# Patient Record
(demographics unavailable — no encounter records)

---

## 2024-10-09 NOTE — PHYSICAL EXAM
[de-identified] : Constitutional: The patient appears well developed, well nourished. Examination of patients ability to communicate functionally was normal.       Neurologic: Coordination is normal. Alert and oriented to time, place and person. No evidence of mood disorder, calm affect.        LEFT     KNEE  : Inspection of the knee is as follows: moderate effusion. no ecchymosis, no streaking, no erythema, no atrophy, no deformities of the quad tendon and no deformities of patellar tendon.      SCAR NOTED ANTEROMEDIAL KNEE WITHOUT SIGNS OF INFECTION  Palpation of the knee is as follows: medial joint line tenderness, lateral joint line tenderness, medial facet of patella tenderness, lateral facet of patella tenderness and crepitus. no palpable masses and no increased warmth.       Knee Range of Motion is as follows in degrees:        Extension: 5    Flexion: 105        Strength examination of the knee is as follows:    Quadriceps strength is 5/5    Hamstring strength is 5/5       Ligament Stability and Special Test ligamentously stable, negative anterior draw, negative Lachman test, negative posterior draw and no varus or valgus instability.    negative McMurrays test and able to do active straight leg raise without an extensor lag.       Neurological examination of the knee is as follows: light touch is intact throughout.       Gait and function is as follows: mildly antalgic gait.

## 2024-10-09 NOTE — HISTORY OF PRESENT ILLNESS
[de-identified] : Patient is here for a follow up on left knee pain.  He states he was trying to observe but lately the knee pain is severe.  He cant tolerate upstairs.  He notes pain at night when he rolls over

## 2025-01-08 NOTE — PHYSICAL EXAM
[de-identified] : Constitutional: The patient appears well developed, well nourished. Examination of patients ability to communicate functionally was normal.       Neurologic: Coordination is normal. Alert and oriented to time, place and person. No evidence of mood disorder, calm affect.        LEFT     KNEE  : Inspection of the knee is as follows: moderate effusion. no ecchymosis, no streaking, no erythema, no atrophy, no deformities of the quad tendon and no deformities of patellar tendon.      SCAR NOTED ANTEROMEDIAL KNEE WITHOUT SIGNS OF INFECTION  Palpation of the knee is as follows: medial joint line tenderness, lateral joint line tenderness, medial facet of patella tenderness, lateral facet of patella tenderness and crepitus. no palpable masses and no increased warmth.       Knee Range of Motion is as follows in degrees:        Extension: 5    Flexion: 105        Strength examination of the knee is as follows:    Quadriceps strength is 5/5    Hamstring strength is 5/5       Ligament Stability and Special Test ligamentously stable, negative anterior draw, negative Lachman test, negative posterior draw and no varus or valgus instability.    negative McMurrays test and able to do active straight leg raise without an extensor lag.       Neurological examination of the knee is as follows: light touch is intact throughout.       Gait and function is as follows: mildly antalgic gait.

## 2025-01-08 NOTE — PROCEDURE
[FreeTextEntry3] : Large joint injection was performed of the LEFT KNEE  knee, through super[facility] portal. The indication for this procedure was pain, inflammation and x-ray evidence of Osteoarthritis on this or prior visit, and this is a repeat injection series. The site was prepped with betadine and a sterile technique used. An injection of 16.8mg of Gel-Syn, series # 1 was used. Patient tolerated procedure well. Patient was advised to call if redness, pain or fever occur and apply ice for 15 minutes out of every hour for the next 12-24 hours as tolerated. Patient had decreased mobility in the joint and the risks benefits, and alternatives have been discussed, and verbal consent was obtained.

## 2025-01-08 NOTE — HISTORY OF PRESENT ILLNESS
[de-identified] : 01/08/2025  RANGEL BAE 74 year y/o M presents today for follow up for left knee pain.  Treatments since last visit: CSI L knee on 10/09/24 with relief for a couple of weeks, PT D/C about 2 weeks ago Changes Since Last Visit: Patient reports his L knee pain has progressively worsened since last visit.   10/09/24: Patient is here for a follow up on left knee pain.  He states he was trying to observe but lately the knee pain is severe.  He cant tolerate upstairs.  He notes pain at night when he rolls over

## 2025-01-08 NOTE — DISCUSSION/SUMMARY
[de-identified] :  RANGEL BAE 74 year M was seen and evaluated in office today. Following evaluation, the natural history of the pathology was explained in full to the patient in layman's terms.   Several different treatment options were discussed and explained in full to the patient, along with specific risks and benefits of both surgical and non-surgical treatments. Nonsurgical options including but not limited to Corticosteroid Injection, Visco supplementation, Prescription anti-inflammatory medications (both steroidal and non-steroidal), activity modification, non-impact exercise, maintaining a healthy BMI, bracing, and icing were all reviewed. Surgical options including but not limited to arthroscopy, and joint replacement along with other indicated procedures were discussed.   Discussed risk of morbidity associated with proposed treatment plans. These risks include, but are not limited to, the risk associated with prescription strength medications and possible side effects of these medications which include GI hemorrhage with oral medications along with cardiac/renal issues with long term use. Risks with all pertinent surgical interventions as discussed with patient including infection, bleeding, anesthesia complications, NV injury, fracture, DVT, or heterotopic ossification.   Furthermore, discussed with RANGEL that they could also delay any immediate treatment options and continue to observe and self-care for the discussed problem. Discussed Home Exercise Programs as well as Rest, Ice and elevation. Patient had ample time to ask any questions about todays visit and the diagnosis, and all questions were answered. Patient understands the plan going forward.   Lastly, based on this patient's presentation at our office, which is an orthopedic specialist office, this patient inherently / intrinsically has a risk of progression of symptoms/condition, as well as development and/or exacerbation of cardiovascular disease/conditions, obesity, DVT, worsening and chronic pain, and permanent loss of function, as well as decreased abilities to complete activities of daily life.  PLAN:   At this time, following discussion of options with the patient, patient would like to proceed with HA injection series.  At this time the plan is to proceed with HA injection series. Patient has at this time had no relief from previous NSAID use, Physical therapy, Ice and rest. Patient reports that they continue to have pain and limitations with activities of daily life due to knee pain. Patient also reports that they have been avoiding pain inducing activity with no significant relief.   Proceeded with left knee Gelsyn injection, follow up in 1 week for 2nd injection  Entered by Rc Butler acting as scribe. Dr. Bautista Attestation The documentation recorded by the scribe, in my presence, accurately reflects the service I, Dr. Bautista, personally performed, and the decisions made by me with my edits as appropriate.

## 2025-01-15 NOTE — REASON FOR VISIT
[FreeTextEntry2] : Patient here for left knee Gelsyn #2, patient reports mild relief from first injection.

## 2025-01-15 NOTE — PROCEDURE
[FreeTextEntry3] : Large joint injection was performed of the LEFT   knee, through [facility] portal. The indication for this procedure was pain, inflammation and x-ray evidence of Osteoarthritis on this or prior visit, and this is a repeat injection series. The site was prepped with betadine and a sterile technique used. An injection of 16.8mg of Gel-Syn, series # 2 was used. Patient tolerated procedure well. Patient was advised to call if redness, pain or fever occur and apply ice for 15 minutes out of every hour for the next 12-24 hours as tolerated. Patient had decreased mobility in the joint and the risks benefits, and alternatives have been discussed, and verbal consent was obtained.

## 2025-01-22 NOTE — PROCEDURE
[Large Joint Injection] : Large joint injection [Left] : of the left [Knee] : knee [Pain] : pain [Inflammation] : inflammation [X-ray evidence of Osteoarthritis on this or prior visit] : x-ray evidence of Osteoarthritis on this or prior visit [Betadine] : betadine [Sterile technique used] : sterile technique used [Gel-Syn (16.8mg)] : 16.8mg of Gel-Syn [#3] : series #3 [] : Patient tolerated procedure well [Call if redness, pain or fever occur] : call if redness, pain or fever occur [Apply ice for 15min out of every hour for the next 12-24 hours as tolerated] : apply ice for 15 minutes out of every hour for the next 12-24 hours as tolerated [Patient was advised to rest the joint(s) for ____ days] : patient was advised to rest the joint(s) for [unfilled] days [Risks, benefits, alternatives discussed / Verbal consent obtained] : the risks benefits, and alternatives have been discussed, and verbal consent was obtained

## 2025-03-05 NOTE — HISTORY OF PRESENT ILLNESS
[de-identified] : 03/05/2025 RANGEL BAE 74 year y/o M presents today for follow up on left knee pain Treatments since last visit: Gelsyn injections (last injection was 01/22/25) with transient relief, patient reports taking tramadol PRN (prescribed by PM) with minimal relief Changes Since Last Visit: Patient reports left knee pain is worse since last visit.   01/08/2025  RANGEL BAE 74 year y/o M presents today for follow up for left knee pain.  Treatments since last visit: CSI L knee on 10/09/24 with relief for a couple of weeks, PT D/C about 2 weeks ago Changes Since Last Visit: Patient reports his L knee pain has progressively worsened since last visit.   10/09/24: Patient is here for a follow up on left knee pain.  He states he was trying to observe but lately the knee pain is severe.  He cant tolerate upstairs.  He notes pain at night when he rolls over

## 2025-03-05 NOTE — DISCUSSION/SUMMARY
[de-identified] : RANGEL BAE 74 year  M was seen and evaluated in office today. Following evaluation, the natural history of the pathology was explained in full to the patient in layman's terms. At this time, they explained to the patient that based on physical exam and imaging review, patient likely has Medial/lateral meniscal tear of the knee. Discussed with patients that due to this meniscal tear, they are at increased risk of developing knee osteoarthritis. Discussed further with patient that due to the fragile blood supply of the meniscus and the nature of the condition, it is unlikely that the tear will heal on its own, and they will likely experience constant/episodic pain in the knee limiting ADLs. Discussed that due to the meniscal tear and the associated increased risk of progression to knee osteoarthritis ultimately leading to an eventual need for knee arthroplasty in the future.    In the interim, discussed with patient several different treatment options, along with specific risks and benefits of both surgical and non-surgical treatments. Nonsurgical options including but not limited to Corticosteroid Injection, Visco supplementation series, Meloxicam 15mg, Medrol Dose Pack, along with activity modification such as non-impact exercise. Risk of morbidity associated with proposed treatments were discussed. Risks include, but are not limited to, the risk associated with prescription strength medications and possible side effects of these medications which include GI hemorrhage with oral medications along with cardiac/renal issues with long term use  Furthermore, discussed with RANGEL that they could also delay any immediate treatment options and continue to observe and self-care for the discussed problem. Discussed Home Exercise Programs as well as Rest, Ice and elevation. Patient had ample time to ask any questions about todays visit and the diagnosis, and all questions were answered. Patient understands the plan going forward.    Based on this patient's presentation at our office, which is an orthopedic specialist office, this patients condition is expected to progress. Patient, due to this condition has the risk of development and/or exacerbation of cardiovascular disease/conditions, obesity, DVT, worsening and chronic pain, and permanent loss of function, as well as decreased abilities to complete activities of daily life.   PLAN:   HE CANT HAVE MRI DUE TO NERVE STIMULATOR.  Patient has MMT on the left side based on PE.  Discussed options including repeating CSI, NSAIDS as well as scoping the knee vs TKA.  He understands scoping the knee may have give him long lasting relief.  He has DJD.   At this time the patient is indicated for meniscectomy of the LEFT KNEE. Patient has had no relief from CSI and HA series along with HEP. Patient has positive McMurrays test. Patient was advised that due to the mild degenerative changes on xray he may not have full relief from Arthroscopy, and verbally accepted this.

## 2025-03-05 NOTE — PHYSICAL EXAM
[de-identified] : Constitutional: The patient appears well developed, well nourished. Examination of patients ability to communicate functionally was normal.       Neurologic: Coordination is normal. Alert and oriented to time, place and person. No evidence of mood disorder, calm affect.        LEFT     KNEE  : Inspection of the knee is as follows: moderate effusion. no ecchymosis, no streaking, no erythema, no atrophy, no deformities of the quad tendon and no deformities of patellar tendon.      SCAR NOTED ANTEROMEDIAL KNEE WITHOUT SIGNS OF INFECTION  Palpation of the knee is as follows: medial joint line tenderness, lateral joint line tenderness, medial facet of patella tenderness, lateral facet of patella tenderness and crepitus. no palpable masses and no increased warmth.       Knee Range of Motion is as follows in degrees:        Extension: 5    Flexion: 105        Strength examination of the knee is as follows:    Quadriceps strength is 5/5    Hamstring strength is 5/5       Ligament Stability and Special Test ligamentously stable, negative anterior draw, negative Lachman test, negative posterior draw and no varus or valgus instability.    negative McMurrays test and able to do active straight leg raise without an extensor lag.       Neurological examination of the knee is as follows: light touch is intact throughout.       Gait and function is as follows: mildly antalgic gait.

## 2025-04-15 NOTE — HISTORY OF PRESENT ILLNESS
[de-identified] : Patient presents today for 1st PO Visit, patient had Left Knee Arthroscopy Medial/Lateral Partial Meniscectomy with Dr. Bautista on 4/4/25. Patient reports that he was doing well, until 4/11/25 when he was standing up from a chair and heard a pop in his knee and has had continued pain in the medial and posterior aspect of the knee. Patient denies fevers, chills, SOB. Patient reports that his pain is well controlled. Patient reports occasional use of Oxycodone, BID. Ambulating without support.

## 2025-04-15 NOTE — PHYSICAL EXAM
[Left] : left knee [5___] : hamstring 5[unfilled]/5 [] : no extensor lag [[id number]] : flexion 110 degrees [de-identified] : extension 0 degrees

## 2025-05-19 NOTE — HISTORY OF PRESENT ILLNESS
[de-identified] : 05/19/2025  RANGEL BAE 75 year y/o M presents today for follow up on left knee pain. Patient is s/p left knee partial medial and lateral meniscectomy and tricompartmental chondroplasy on 4/4/2025. Treatments since last visit: Patient saw Dr Feliciano on 4/15/2025 for first poa. Patient is in PT 2 x a week.  Changes Since Last Visit: Patient reports he is progressing well, patient denies fevers, chills or SOB.  He states the knee is less painful when compared to pre op.  He still has issues with stairs  03/05/2025 RANGEL BAE 74 year y/o M presents today for follow up on left knee pain Treatments since last visit: Gelsyn injections (last injection was 01/22/25) with transient relief, patient reports taking tramadol PRN (prescribed by PM) with minimal relief Changes Since Last Visit: Patient reports left knee pain is worse since last visit.   01/08/2025  RANGEL BAE 74 year y/o M presents today for follow up for left knee pain.  Treatments since last visit: CSI L knee on 10/09/24 with relief for a couple of weeks, PT D/C about 2 weeks ago Changes Since Last Visit: Patient reports his L knee pain has progressively worsened since last visit.   10/09/24: Patient is here for a follow up on left knee pain.  He states he was trying to observe but lately the knee pain is severe.  He cant tolerate upstairs.  He notes pain at night when he rolls over

## 2025-05-19 NOTE — DISCUSSION/SUMMARY
[de-identified] : At this time the patient is progressing well. Patient demonstrates improvements in both range of motion and strength. Took time to discuss with patient the importance of maintaining ROM and Strength through daily HEP. Patient expressed understanding of this and will continue HEP.    At this time after discussion of the options, the patient would benefit from CONTINUED organized Physical Therapy to increase overall functionality, as they have reported feeling of improvement. The patient was provided with an updated prescription in office today and was instructed to attend PT for 6-8 weeks in order to increase strength and ROM. Patient agreed with this plan and will begin PT as soon as they can. Discussed importance of patient continuing the exercises on their own as well.   Patient was instructed to take antibiotic prophylaxis prior to any dental or GI procedures. The patient understands this precaution is to be followed for life due to the potential risk of infection.    f/u in 2 mos.

## 2025-05-19 NOTE — PHYSICAL EXAM
[de-identified] : Constitutional: The patient appears well developed, well nourished. Examination of patients ability to communicate functionally was normal.       Neurologic: Coordination is normal. Alert and oriented to time, place and person. No evidence of mood disorder, calm affect.        LEFT     KNEE  : Inspection of the knee is as follows: moderate effusion. no ecchymosis, no streaking, no erythema, no atrophy, no deformities of the quad tendon and no deformities of patellar tendon.      SCAR NOTED ANTEROMEDIAL KNEE WITHOUT SIGNS OF INFECTION  Palpation of the knee is as follows:WOUNDS WELL HEALED FROM SCOPE     Knee Range of Motion is as follows in degrees:        Extension: 0    Flexion: 115        Strength examination of the knee is as follows:    Quadriceps strength is 5/5    Hamstring strength is 5/5       Ligament Stability and Special Test ligamentously stable, negative anterior draw, negative Lachman test, negative posterior draw and no varus or valgus instability.    negative McMurrays test and able to do active straight leg raise without an extensor lag.       Neurological examination of the knee is as follows: light touch is intact throughout.       Gait and function is as follows: mildly antalgic gait.

## 2025-07-14 NOTE — DISCUSSION/SUMMARY
[de-identified] : LEFT KNEE: Patient is doing well at this time and continuing to make satisfactory progress status post total joint arthroplasty. Patient has been involved in OPPT since the last visit and continues to demonstrate improvements in ROM and strength.    At this time, discussed with patient that with their demonstrated continued progress in functionality, they may finish PT/continue as desired. Advised patient that going forward, their focus should be on daily HEP to maintain ROM and Strength.    At this time the plan that the patient wishes to move forward with is to observe and continue self care including but not limited to HEP, Ice, NSAIDs and rest as needed. Patient was instructed to f/u if their symptoms do not improve or if there are any further concerns.   RIGHT KNEE:   RANGEL BAE 75 year M was seen and evaluated in office today. Following evaluation, the natural history of the pathology was explained in full to the patient in layman's terms.   Patient was given a prescription for a Medrol dose pack DUE TO INCREASED PAIN AND INFLAMMATION OF THE RIGHT KNEE. There is a moderate risk of morbidity with further treatment, especially from use of prescription strength medication and possible side effects of the medication. We discussed the importance of proper prescription drug management. They were instructed to take the medication as directed for 5 days and will follow up in one month. The patient was also warned of potential risks/side effects of the medication. These potential risks include allergic reaction, temporary weight gain, elevation of blood sugar, increased thirst, headache, blurred vision, dizziness and avascular necrosis. The patient expressed verbal understanding. I recommend that the patient follow-up with their medical physician to discuss any significant specific potential issues with long term use such as interactions with current medications or with exacerbation of underlying medical morbidities.   Patient was advised to CONSULT WITH PCP AND WITH SPINE SPECIALIST REGARDING USE OF MDP AND POSSIBLE ELENITA IN COMING WEEKS.

## 2025-07-14 NOTE — PHYSICAL EXAM
[Right] : right knee [Components well fixed, in good position] : Components well fixed, in good position [de-identified] : Constitutional: The patient appears well developed, well nourished. Examination of patients ability to communicate functionally was normal.       Neurologic: Coordination is normal. Alert and oriented to time, place and person. No evidence of mood disorder, calm affect.        LEFT     KNEE  : Inspection of the knee is as follows: moderate effusion. no ecchymosis, no streaking, no erythema, no atrophy, no deformities of the quad tendon and no deformities of patellar tendon.      WOUNDS WELL HEALED      Knee Range of Motion is as follows in degrees:        Extension: 0    Flexion: 95        Strength examination of the knee is as follows:    Quadriceps strength is 5/5    Hamstring strength is 5/5       Ligament Stability and Special Test ligamentously stable, negative anterior draw, negative Lachman test, negative posterior draw and no varus or valgus instability.    negative McMurrays test and able to do active straight leg raise without an extensor lag.       Neurological examination of the knee is as follows: light touch is intact throughout.       Gait and function is as follows: mildly antalgic gait.    Constitutional: The patient appears well developed, well nourished. Examination of patients ability to communicate functionally was normal.       Neurologic: Coordination is normal. Alert and oriented to time, place and person. No evidence of mood disorder, calm affect.          RIGHT   KNEE  : Inspection of the knee is as follows: MILD  effusion. no ecchymosis, no streaking, no erythema, no atrophy, no deformities of the quad tendon and no deformities of patellar tendon.   SCAR NOTED RIGHT KNEE NO SIGNS OF INFECTION      Palpation of the knee is as follows: medial joint line tenderness, lateral joint line tenderness, medial facet of patella tenderness, lateral facet of patella tenderness and crepitus. no palpable masses and no increased warmth.       Knee Range of Motion is as follows in degrees:        Extension: 0    Flexion: 95        Strength examination of the knee is as follows:    Quadriceps strength is 5/5    Hamstring strength is 5/5       Ligament Stability and Special Test ligamentously stable, negative anterior draw, negative Lachman test, negative posterior draw and no varus or valgus instability.    negative McMurrays test and able to do active straight leg raise without an extensor lag.       Neurological examination of the knee is as follows: light touch is intact throughout.       Gait and function is as follows: mildly antalgic gait.

## 2025-07-14 NOTE — HISTORY OF PRESENT ILLNESS
[de-identified] : 07/14/2025  RANGEL BEA 75 year y/o M presents today for follow up on left knee pain. Patient is s/p left knee partial medial and lateral meniscectomy and tricompartmental chondroplasy on 4/4/2025. Treatments since last visit: PT 2 x a week.  Changes Since Last Visit: Patient reports left knee is progressing well. Patient reports right knee is extremely painful.  The left knee pain has improved when compared to pre op. He still has pain with flexion of the knee.   05/19/2025  RANGEL BAE 75 year y/o M presents today for follow up on left knee pain. Patient is s/p left knee partial medial and lateral meniscectomy and tricompartmental chondroplasy on 4/4/2025. Treatments since last visit: Patient saw Dr Feliciano on 4/15/2025 for first poa. Patient is in PT 2 x a week.  Changes Since Last Visit: Patient reports he is progressing well, patient denies fevers, chills or SOB.  He states the knee is less painful when compared to pre op.  He still has issues with stairs  03/05/2025 RANGEL BAE 74 year y/o M presents today for follow up on left knee pain Treatments since last visit: Gelsyn injections (last injection was 01/22/25) with transient relief, patient reports taking tramadol PRN (prescribed by PM) with minimal relief Changes Since Last Visit: Patient reports left knee pain is worse since last visit.   01/08/2025  RANGEL BAE 74 year y/o M presents today for follow up for left knee pain.  Treatments since last visit: CSI L knee on 10/09/24 with relief for a couple of weeks, PT D/C about 2 weeks ago Changes Since Last Visit: Patient reports his L knee pain has progressively worsened since last visit.   10/09/24: Patient is here for a follow up on left knee pain.  He states he was trying to observe but lately the knee pain is severe.  He cant tolerate upstairs.  He notes pain at night when he rolls over